# Patient Record
Sex: FEMALE | Race: WHITE | NOT HISPANIC OR LATINO | Employment: OTHER | URBAN - METROPOLITAN AREA
[De-identification: names, ages, dates, MRNs, and addresses within clinical notes are randomized per-mention and may not be internally consistent; named-entity substitution may affect disease eponyms.]

---

## 2023-09-09 PROBLEM — G47.00 INSOMNIA: Status: ACTIVE | Noted: 2023-09-09

## 2023-09-09 PROBLEM — B00.1 HERPES LABIALIS: Status: ACTIVE | Noted: 2023-09-09

## 2023-09-09 PROBLEM — E03.9 HYPOTHYROIDISM: Status: ACTIVE | Noted: 2023-09-09

## 2023-09-09 PROBLEM — M51.36 DEGENERATIVE LUMBAR DISC: Status: ACTIVE | Noted: 2023-09-09

## 2023-09-09 PROBLEM — D70.9 NEUTROPENIA (CMS-HCC): Status: ACTIVE | Noted: 2023-09-09

## 2023-09-09 PROBLEM — M51.369 DEGENERATIVE LUMBAR DISC: Status: ACTIVE | Noted: 2023-09-09

## 2023-09-09 PROBLEM — N90.4 VULVAR DYSTROPHY: Status: ACTIVE | Noted: 2023-09-09

## 2023-09-09 PROBLEM — E78.5 HYPERLIPIDEMIA: Status: ACTIVE | Noted: 2023-09-09

## 2023-09-09 PROBLEM — M16.11 PRIMARY OSTEOARTHRITIS OF RIGHT HIP: Status: ACTIVE | Noted: 2023-09-09

## 2023-09-09 PROBLEM — R73.9 HYPERGLYCEMIA: Status: ACTIVE | Noted: 2023-09-09

## 2023-09-09 PROBLEM — E55.9 VITAMIN D DEFICIENCY: Status: ACTIVE | Noted: 2023-09-09

## 2023-09-09 PROBLEM — M19.90 DEGENERATIVE JOINT DISEASE: Status: ACTIVE | Noted: 2023-09-09

## 2023-09-09 RX ORDER — ACYCLOVIR 400 MG/1
400 TABLET ORAL
COMMUNITY
Start: 2009-05-08 | End: 2023-12-18 | Stop reason: SDUPTHER

## 2023-09-09 RX ORDER — MULTIVITAMIN
TABLET ORAL DAILY
COMMUNITY
Start: 2009-05-08

## 2023-09-09 RX ORDER — CLOBETASOL PROPIONATE 0.5 MG/G
OINTMENT TOPICAL AS NEEDED
COMMUNITY
Start: 2020-02-13

## 2023-09-09 RX ORDER — VALACYCLOVIR HYDROCHLORIDE 1 G/1
TABLET, FILM COATED ORAL
COMMUNITY
Start: 2015-07-27 | End: 2024-03-26 | Stop reason: ALTCHOICE

## 2023-09-09 RX ORDER — LEVOTHYROXINE SODIUM 50 UG/1
50 TABLET ORAL DAILY
COMMUNITY
Start: 2009-05-08 | End: 2024-03-26 | Stop reason: SDUPTHER

## 2023-09-09 RX ORDER — CHOLECALCIFEROL (VITAMIN D3) 50 MCG
2000 TABLET ORAL DAILY
COMMUNITY
Start: 2022-03-16

## 2023-09-09 RX ORDER — SIMVASTATIN 10 MG/1
10 TABLET, FILM COATED ORAL EVERY EVENING
COMMUNITY
End: 2024-03-26 | Stop reason: SDUPTHER

## 2023-09-09 RX ORDER — ASCORBIC ACID 500 MG
500 TABLET ORAL DAILY
COMMUNITY
End: 2024-03-26 | Stop reason: ALTCHOICE

## 2023-12-18 ENCOUNTER — TELEPHONE (OUTPATIENT)
Dept: PRIMARY CARE | Facility: CLINIC | Age: 68
End: 2023-12-18

## 2023-12-18 DIAGNOSIS — B00.1 HERPES LABIALIS: ICD-10-CM

## 2023-12-18 RX ORDER — ACYCLOVIR 400 MG/1
400 TABLET ORAL
Qty: 30 TABLET | Refills: 5 | Status: SHIPPED | OUTPATIENT
Start: 2023-12-18

## 2024-03-21 ENCOUNTER — LAB (OUTPATIENT)
Dept: LAB | Facility: LAB | Age: 69
End: 2024-03-21
Payer: MEDICARE

## 2024-03-21 DIAGNOSIS — E03.9 HYPOTHYROIDISM, UNSPECIFIED: ICD-10-CM

## 2024-03-21 DIAGNOSIS — E78.5 HYPERLIPIDEMIA, UNSPECIFIED: Primary | ICD-10-CM

## 2024-03-21 DIAGNOSIS — D70.9 NEUTROPENIA, UNSPECIFIED (CMS-HCC): ICD-10-CM

## 2024-03-21 DIAGNOSIS — E55.9 VITAMIN D DEFICIENCY, UNSPECIFIED: ICD-10-CM

## 2024-03-21 DIAGNOSIS — R73.9 HYPERGLYCEMIA, UNSPECIFIED: ICD-10-CM

## 2024-03-21 LAB
25(OH)D3 SERPL-MCNC: 36 NG/ML (ref 31–100)
ALBUMIN SERPL-MCNC: 4.4 G/DL (ref 3.5–5)
ALP BLD-CCNC: 60 U/L (ref 35–125)
ALT SERPL-CCNC: 24 U/L (ref 5–40)
ANION GAP SERPL CALC-SCNC: 11 MMOL/L
AST SERPL-CCNC: 21 U/L (ref 5–40)
BILIRUB SERPL-MCNC: 0.8 MG/DL (ref 0.1–1.2)
BUN SERPL-MCNC: 14 MG/DL (ref 8–25)
CALCIUM SERPL-MCNC: 9.2 MG/DL (ref 8.5–10.4)
CHLORIDE SERPL-SCNC: 106 MMOL/L (ref 97–107)
CHOLEST SERPL-MCNC: 206 MG/DL (ref 133–200)
CHOLEST/HDLC SERPL: 3.7 {RATIO}
CO2 SERPL-SCNC: 27 MMOL/L (ref 24–31)
CREAT SERPL-MCNC: 1 MG/DL (ref 0.4–1.6)
EGFRCR SERPLBLD CKD-EPI 2021: 62 ML/MIN/1.73M*2
ERYTHROCYTE [DISTWIDTH] IN BLOOD BY AUTOMATED COUNT: 13 % (ref 11.5–14.5)
EST. AVERAGE GLUCOSE BLD GHB EST-MCNC: 126 MG/DL
GLUCOSE SERPL-MCNC: 107 MG/DL (ref 65–99)
HBA1C MFR BLD: 6 %
HCT VFR BLD AUTO: 40.6 % (ref 36–46)
HDLC SERPL-MCNC: 56 MG/DL
HGB BLD-MCNC: 12.7 G/DL (ref 12–16)
LDLC SERPL CALC-MCNC: 111 MG/DL (ref 65–130)
MCH RBC QN AUTO: 30.2 PG (ref 26–34)
MCHC RBC AUTO-ENTMCNC: 31.3 G/DL (ref 32–36)
MCV RBC AUTO: 97 FL (ref 80–100)
NRBC BLD-RTO: 0 /100 WBCS (ref 0–0)
PLATELET # BLD AUTO: 214 X10*3/UL (ref 150–450)
POTASSIUM SERPL-SCNC: 4.3 MMOL/L (ref 3.4–5.1)
PROT SERPL-MCNC: 6.4 G/DL (ref 5.9–7.9)
RBC # BLD AUTO: 4.2 X10*6/UL (ref 4–5.2)
SODIUM SERPL-SCNC: 144 MMOL/L (ref 133–145)
TRIGL SERPL-MCNC: 194 MG/DL (ref 40–150)
TSH SERPL DL<=0.05 MIU/L-ACNC: 2.88 MIU/L (ref 0.27–4.2)
WBC # BLD AUTO: 4 X10*3/UL (ref 4.4–11.3)

## 2024-03-21 PROCEDURE — 80053 COMPREHEN METABOLIC PANEL: CPT

## 2024-03-21 PROCEDURE — 84443 ASSAY THYROID STIM HORMONE: CPT

## 2024-03-21 PROCEDURE — 36415 COLL VENOUS BLD VENIPUNCTURE: CPT

## 2024-03-21 PROCEDURE — 83036 HEMOGLOBIN GLYCOSYLATED A1C: CPT

## 2024-03-21 PROCEDURE — 80061 LIPID PANEL: CPT

## 2024-03-21 PROCEDURE — 82306 VITAMIN D 25 HYDROXY: CPT

## 2024-03-21 PROCEDURE — 85027 COMPLETE CBC AUTOMATED: CPT

## 2024-03-26 ENCOUNTER — OFFICE VISIT (OUTPATIENT)
Dept: PRIMARY CARE | Facility: CLINIC | Age: 69
End: 2024-03-26
Payer: MEDICARE

## 2024-03-26 VITALS
DIASTOLIC BLOOD PRESSURE: 84 MMHG | HEIGHT: 64 IN | SYSTOLIC BLOOD PRESSURE: 128 MMHG | TEMPERATURE: 97.9 F | HEART RATE: 72 BPM | OXYGEN SATURATION: 97 % | BODY MASS INDEX: 29.71 KG/M2 | WEIGHT: 174 LBS

## 2024-03-26 DIAGNOSIS — E03.9 ACQUIRED HYPOTHYROIDISM: ICD-10-CM

## 2024-03-26 DIAGNOSIS — Z00.00 ENCOUNTER FOR MEDICARE ANNUAL WELLNESS EXAM: Primary | ICD-10-CM

## 2024-03-26 DIAGNOSIS — E78.2 MIXED HYPERLIPIDEMIA: ICD-10-CM

## 2024-03-26 DIAGNOSIS — Z12.31 ENCOUNTER FOR SCREENING MAMMOGRAM FOR BREAST CANCER: ICD-10-CM

## 2024-03-26 DIAGNOSIS — E55.9 VITAMIN D DEFICIENCY: ICD-10-CM

## 2024-03-26 PROCEDURE — 1036F TOBACCO NON-USER: CPT | Performed by: NURSE PRACTITIONER

## 2024-03-26 PROCEDURE — 1159F MED LIST DOCD IN RCRD: CPT | Performed by: NURSE PRACTITIONER

## 2024-03-26 PROCEDURE — G0439 PPPS, SUBSEQ VISIT: HCPCS | Performed by: NURSE PRACTITIONER

## 2024-03-26 PROCEDURE — 1126F AMNT PAIN NOTED NONE PRSNT: CPT | Performed by: NURSE PRACTITIONER

## 2024-03-26 PROCEDURE — 99215 OFFICE O/P EST HI 40 MIN: CPT | Performed by: NURSE PRACTITIONER

## 2024-03-26 RX ORDER — IBUPROFEN 100 MG/5ML
1000 SUSPENSION, ORAL (FINAL DOSE FORM) ORAL DAILY
COMMUNITY

## 2024-03-26 RX ORDER — SIMVASTATIN 20 MG/1
20 TABLET, FILM COATED ORAL EVERY EVENING
Qty: 90 TABLET | Refills: 3 | Status: SHIPPED | OUTPATIENT
Start: 2024-03-26

## 2024-03-26 RX ORDER — LEVOTHYROXINE SODIUM 50 UG/1
50 TABLET ORAL DAILY
Qty: 90 TABLET | Refills: 3 | Status: SHIPPED | OUTPATIENT
Start: 2024-03-26

## 2024-03-26 ASSESSMENT — ENCOUNTER SYMPTOMS
ADENOPATHY: 0
DEPRESSION: 0
ABDOMINAL PAIN: 0
DIAPHORESIS: 0
DIZZINESS: 0
BACK PAIN: 0
HEADACHES: 0
SPEECH DIFFICULTY: 0
COUGH: 0
FATIGUE: 0
SEIZURES: 0
AGITATION: 0
NAUSEA: 0
POLYDIPSIA: 0
BRUISES/BLEEDS EASILY: 0
PALPITATIONS: 0
FLANK PAIN: 0
WOUND: 0
DYSURIA: 0
POLYPHAGIA: 0
VOMITING: 0
CHEST TIGHTNESS: 0
NECK PAIN: 0
LOSS OF SENSATION IN FEET: 0
SHORTNESS OF BREATH: 0
FACIAL ASYMMETRY: 0
BLOOD IN STOOL: 0
CONFUSION: 0
CHILLS: 0
OCCASIONAL FEELINGS OF UNSTEADINESS: 0
HEMATURIA: 0
FEVER: 0

## 2024-03-26 ASSESSMENT — LIFESTYLE VARIABLES
HAVE YOU OR SOMEONE ELSE BEEN INJURED AS A RESULT OF YOUR DRINKING: NO
SKIP TO QUESTIONS 9-10: 1
HOW OFTEN DURING THE LAST YEAR HAVE YOU FAILED TO DO WHAT WAS NORMALLY EXPECTED FROM YOU BECAUSE OF DRINKING: NEVER
HOW OFTEN DO YOU HAVE A DRINK CONTAINING ALCOHOL: NEVER
HOW OFTEN DURING THE LAST YEAR HAVE YOU NEEDED AN ALCOHOLIC DRINK FIRST THING IN THE MORNING TO GET YOURSELF GOING AFTER A NIGHT OF HEAVY DRINKING: NEVER
HOW OFTEN DURING THE LAST YEAR HAVE YOU BEEN UNABLE TO REMEMBER WHAT HAPPENED THE NIGHT BEFORE BECAUSE YOU HAD BEEN DRINKING: NEVER
HOW OFTEN DURING THE LAST YEAR HAVE YOU HAD A FEELING OF GUILT OR REMORSE AFTER DRINKING: NEVER
AUDIT TOTAL SCORE: 0
HOW MANY STANDARD DRINKS CONTAINING ALCOHOL DO YOU HAVE ON A TYPICAL DAY: PATIENT DOES NOT DRINK
HAS A RELATIVE, FRIEND, DOCTOR, OR ANOTHER HEALTH PROFESSIONAL EXPRESSED CONCERN ABOUT YOUR DRINKING OR SUGGESTED YOU CUT DOWN: NO
HOW OFTEN DO YOU HAVE SIX OR MORE DRINKS ON ONE OCCASION: NEVER
HOW OFTEN DURING THE LAST YEAR HAVE YOU FOUND THAT YOU WERE NOT ABLE TO STOP DRINKING ONCE YOU HAD STARTED: NEVER
AUDIT-C TOTAL SCORE: 0

## 2024-03-26 ASSESSMENT — PATIENT HEALTH QUESTIONNAIRE - PHQ9
SUM OF ALL RESPONSES TO PHQ9 QUESTIONS 1 AND 2: 0
1. LITTLE INTEREST OR PLEASURE IN DOING THINGS: NOT AT ALL
2. FEELING DOWN, DEPRESSED OR HOPELESS: NOT AT ALL

## 2024-03-26 ASSESSMENT — PAIN SCALES - GENERAL: PAINLEVEL: 0-NO PAIN

## 2024-03-26 NOTE — PROGRESS NOTES
[facility]: [facility]  MEDICARE WELLNESS EXAM      Olena Guzmán is a 68 y.o. female that is presenting today for CPE.    Assessment/Plan    Diagnoses and all orders for this visit:    Encounter for Medicare annual wellness exam    Mixed hyperlipidemia  -     Tolerating statin. ASCVD Risk 8.7% intermediate. Choleterol could be better controlled.  -     Increase simvastatin (Zocor) 20 mg tablet; Take 1 tablet (20 mg) by mouth once daily in the evening.    Acquired hypothyroidism  -     Clinically and Chemmically Euthyroid  -     Continue levothyroxine (Synthroid, Levoxyl) 50 mcg tablet; Take 1 tablet (50 mcg) by mouth once daily.    Vitamin D deficiency        -     Continue daily OTC Vitamin D supplement    Encounter for screening mammogram for breast cancer  -     BI mammo bilateral screening tomosynthesis; Future    Other orders  -     Follow Up In Primary Care - Medicare Annual; Future    ADVANCED CARE PLANNING  Advanced Care Planning was discussed with patient:  The patient has an active advanced care plan on file. The patient has an active surrogate decision-maker on file.  Encouraged the patient to confirm that Living Will and Healthcare Power of  (HCPoA) are accurate and up to date.  Encouraged the patient to confirm that our office be provided a copy of any documentation in the event that anything changes.    ACTIVITIES OF DAILY LIVING  Basic ADLs:  Bathing: Independent, Dressing: Independent, Toileting: Independent, Transferring: Independent, Continence: Independent, Feeding: Independent.    Instrumental ADLs:  Ability to use phone: Independent, Shopping: Independent, Cooking: Independent, House-keeping: Independent, Laundry: Independent, Transportation: Independent, Medication Management: Independent, Finance Management: Independent.    Subjective   Subjective  Christie Guzmán is a 68 y.o. female who presents for follow up of hypothyroidism. Current symptoms: none.  Patient denies change in energy level, diarrhea, heat / cold intolerance, nervousness, and palpitations. Symptoms have been well-controlled.    [unfilled]     Objective  [unfilled]    Laboratory:  Lab Results       Component                Value               Date                       TSH                      2.88                03/21/2024              Assessment/Plan  Hypothyroidism.  Replacement is Levothyroxine 50 mcg daily  1. L-thyroxine per orders.  2. Recheck thyroid function tests in 1 year  3. Instructed not to take multivitamins or iron within 4 hours of taking thyroid medications.  4. Follow up in 1 year.    Madiha Jassody SIMIN Guzmán is here for follow up of dyslipidemia. Compliance with treatment has been good. The patient exercises daily. Patient denies muscle pain associated with her medications.    [unfilled]    Objective  [unfilled]    Lab Review  Lab Results       Component                Value               Date                       CHOL                     206 (H)             03/21/2024                 CHOL                     195                 03/17/2023                 CHOL                     212 (H)             09/13/2022                 CHOL                     194                 03/11/2022                 TRIG                     194 (H)             03/21/2024                 TRIG                     128                 03/17/2023                 TRIG                     146                 09/13/2022                 TRIG                     180 (H)             03/11/2022                 HDL                      56.0                03/21/2024                 HDL                      55                  03/17/2023                 HDL                      51                  09/13/2022                 HDL                      51                  03/11/2022               Assessment/Plan  Dyslipidemia under fair control.     1. Continue dietary measures.   2. Continue regular exercise.    3. Lipid-lowering medications: Simvastatin 10 mg QHS.  4. Increase Simvastatin to 20 mg daily due to ASCVD Risk 8.7% Intermediate.        Review of Systems   Constitutional:  Negative for chills, diaphoresis, fatigue and fever.   HENT:  Negative for hearing loss and mouth sores.    Eyes:  Negative for visual disturbance.   Respiratory:  Negative for cough, chest tightness and shortness of breath.    Cardiovascular:  Negative for chest pain, palpitations and leg swelling.   Gastrointestinal:  Negative for abdominal pain, blood in stool, nausea and vomiting.   Endocrine: Negative for cold intolerance, heat intolerance, polydipsia, polyphagia and polyuria.   Genitourinary:  Negative for dysuria, flank pain and hematuria.   Musculoskeletal:  Negative for back pain and neck pain.   Skin:  Negative for rash and wound.   Allergic/Immunologic: Negative for environmental allergies, food allergies and immunocompromised state.   Neurological:  Negative for dizziness, seizures, syncope, facial asymmetry, speech difficulty and headaches.   Hematological:  Negative for adenopathy. Does not bruise/bleed easily.   Psychiatric/Behavioral:  Negative for agitation and confusion.      Objective   Vitals:    03/26/24 0946   BP: 144/82   Pulse: 72   Temp: 36.6 °C (97.9 °F)   SpO2: 97%      Body mass index is 29.87 kg/m².  Physical Exam  Vitals and nursing note reviewed.   Constitutional:       General: She is not in acute distress.     Appearance: Normal appearance. She is not ill-appearing.   HENT:      Head: Normocephalic and atraumatic.      Right Ear: Tympanic membrane, ear canal and external ear normal. There is no impacted cerumen.      Left Ear: Tympanic membrane, ear canal and external ear normal. There is no impacted cerumen.      Nose: Nose normal.      Mouth/Throat:      Mouth: Mucous membranes are moist.      Pharynx: Oropharynx is clear. No oropharyngeal exudate or posterior oropharyngeal erythema.   Eyes:      General: No  scleral icterus.        Right eye: No discharge.         Left eye: No discharge.      Extraocular Movements: Extraocular movements intact.      Conjunctiva/sclera: Conjunctivae normal.      Pupils: Pupils are equal, round, and reactive to light.   Neck:      Vascular: No carotid bruit.   Cardiovascular:      Rate and Rhythm: Normal rate and regular rhythm.      Pulses: Normal pulses.      Heart sounds: Normal heart sounds. No murmur heard.  Pulmonary:      Effort: Pulmonary effort is normal. No respiratory distress.      Breath sounds: Normal breath sounds.   Abdominal:      General: Abdomen is flat. Bowel sounds are normal. There is no distension.      Palpations: Abdomen is soft. There is no mass.      Tenderness: There is no abdominal tenderness. There is no right CVA tenderness or left CVA tenderness.      Hernia: No hernia is present.   Musculoskeletal:         General: No tenderness. Normal range of motion.      Cervical back: No tenderness.      Right lower leg: No edema.      Left lower leg: No edema.   Lymphadenopathy:      Cervical: No cervical adenopathy.   Skin:     General: Skin is warm and dry.      Coloration: Skin is not jaundiced.      Findings: No rash.   Neurological:      General: No focal deficit present.      Mental Status: She is alert and oriented to person, place, and time. Mental status is at baseline.   Psychiatric:         Mood and Affect: Mood normal.         Behavior: Behavior normal.       Diagnostic Results   Lab Results   Component Value Date    GLUCOSE 107 (H) 03/21/2024    CALCIUM 9.2 03/21/2024     03/21/2024    K 4.3 03/21/2024    CO2 27 03/21/2024     03/21/2024    BUN 14 03/21/2024    CREATININE 1.00 03/21/2024     Lab Results   Component Value Date    ALT 24 03/21/2024    AST 21 03/21/2024    ALKPHOS 60 03/21/2024    BILITOT 0.8 03/21/2024     Lab Results   Component Value Date    WBC 4.0 (L) 03/21/2024    HGB 12.7 03/21/2024    HCT 40.6 03/21/2024    MCV 97  "03/21/2024     03/21/2024     Lab Results   Component Value Date    CHOL 206 (H) 03/21/2024    CHOL 195 03/17/2023    CHOL 212 (H) 09/13/2022     Lab Results   Component Value Date    HDL 56.0 03/21/2024    HDL 55 03/17/2023    HDL 51 09/13/2022     Lab Results   Component Value Date    LDLCALC 111 03/21/2024    LDLCALC 114 03/17/2023    LDLCALC 132 (H) 09/13/2022     Lab Results   Component Value Date    TRIG 194 (H) 03/21/2024    TRIG 128 03/17/2023    TRIG 146 09/13/2022     No components found for: \"CHOLHDL\"  Lab Results   Component Value Date    HGBA1C 6.0 (H) 03/21/2024     Other labs not included in the list above reviewed either before or during this encounter.    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    Hypothyroidism, unspecified 12/11/2013    Hypothyroidism    Other conditions influencing health status 01/04/2014    Benign Neoplasm Of The Anal Margin    Personal history of cervical dysplasia 02/13/2020    History of cervical dysplasia    Personal history of other diseases of the female genital tract     Vaginal delivery    Personal history of other diseases of the musculoskeletal system and connective tissue     Personal history of arthritis    Personal history of other infectious and parasitic diseases     History of varicella    Personal history of other specified conditions     History of abnormal Pap smear    Postmenopausal atrophic vaginitis 12/13/2013    Postmenopausal atrophic vaginitis    Pure hypercholesterolemia, unspecified     High cholesterol    Sprain of unspecified rotator cuff capsule, initial encounter 12/11/2013    Rotator cuff (capsule) sprain    Tachycardia, unspecified 12/11/2013    Sinus tachycardia    Unspecified injury of shoulder and upper arm, unspecified arm, initial encounter 12/11/2013    Shoulder injury     Past Surgical History:   Procedure Laterality Date    HYSTERECTOMY  03/18/2014    " Hysterectomy    KNEE ARTHROSCOPY W/ DEBRIDEMENT  10/10/2016    Knee Arthroscopy (Therapeutic)    OOPHORECTOMY  07/24/2014    Oophorectomy - Unilateral (Removal Of One Ovary)    ROTATOR CUFF REPAIR  10/10/2016    Rotator Cuff Repair     Family History   Problem Relation Name Age of Onset    Cancer Mother          Gallbladder    Lung cancer Father      Asthma Sister       Social History     Socioeconomic History    Marital status:      Spouse name: Not on file    Number of children: Not on file    Years of education: Not on file    Highest education level: Not on file   Occupational History    Not on file   Tobacco Use    Smoking status: Never     Passive exposure: Never    Smokeless tobacco: Never   Substance and Sexual Activity    Alcohol use: Never    Drug use: Never    Sexual activity: Not on file   Other Topics Concern    Not on file   Social History Narrative    Not on file     Social Determinants of Health     Financial Resource Strain: Not on file   Food Insecurity: Not on file   Transportation Needs: Not on file   Physical Activity: Not on file   Stress: Not on file   Social Connections: Not on file   Intimate Partner Violence: Not on file   Housing Stability: Not on file     No Known Allergies  Current Outpatient Medications on File Prior to Visit   Medication Sig Dispense Refill    acyclovir (Zovirax) 400 mg tablet Take 1 tablet (400 mg) by mouth 5 times a day. as directed/prn 30 tablet 5    ascorbic acid (Vitamin C) 1,000 mg tablet Take 1 tablet (1,000 mg) by mouth once daily.      cholecalciferol (Vitamin D3) 50 MCG (2000 UT) tablet Take 1 tablet (2,000 Units) by mouth once daily.      clobetasol (Temovate) 0.05 % ointment Apply topically if needed. APPLY SPARINGLY TO AFFECTED AREA(S)      multivitamin tablet Take by mouth once daily. As directed      [DISCONTINUED] ascorbic acid (Vitamin C) 500 mg tablet Take 1 tablet (500 mg) by mouth once daily.      [DISCONTINUED] levothyroxine (Synthroid,  Levoxyl) 50 mcg tablet Take 1 tablet (50 mcg) by mouth once daily.      [DISCONTINUED] simvastatin (Zocor) 10 mg tablet Take 1 tablet (10 mg) by mouth once daily in the evening.      [DISCONTINUED] valACYclovir (Valtrex) 1 gram tablet valACYclovir HCl - 1 GM Oral Tablet   Refills: 0        Start : 27-Jul-2015   Active       No current facility-administered medications on file prior to visit.     Immunization History   Administered Date(s) Administered    Moderna SARS-CoV-2 Vaccination 03/17/2021, 04/14/2021, 11/15/2021    Pfizer COVID-19 vaccine, bivalent, age 12 years and older (30 mcg/0.3 mL) 09/27/2022    Pneumococcal conjugate vaccine, 20-valent (PREVNAR 20) 03/21/2023     Patient's medical history was reviewed and updated either before or during this encounter.     Stefany Huang, APRN-CNP

## 2024-04-05 ENCOUNTER — HOSPITAL ENCOUNTER (OUTPATIENT)
Dept: RADIOLOGY | Facility: HOSPITAL | Age: 69
Discharge: HOME | End: 2024-04-05
Payer: MEDICARE

## 2024-04-05 VITALS — HEIGHT: 64 IN | BODY MASS INDEX: 29.02 KG/M2 | WEIGHT: 170 LBS

## 2024-04-05 DIAGNOSIS — Z12.31 ENCOUNTER FOR SCREENING MAMMOGRAM FOR BREAST CANCER: ICD-10-CM

## 2024-04-05 PROCEDURE — 77067 SCR MAMMO BI INCL CAD: CPT | Performed by: RADIOLOGY

## 2024-04-05 PROCEDURE — 77067 SCR MAMMO BI INCL CAD: CPT

## 2024-04-05 PROCEDURE — 77063 BREAST TOMOSYNTHESIS BI: CPT | Performed by: RADIOLOGY

## 2024-12-20 DIAGNOSIS — N90.4 VULVAR DYSTROPHY: Primary | ICD-10-CM

## 2024-12-26 RX ORDER — CLOBETASOL PROPIONATE 0.5 MG/G
OINTMENT TOPICAL 2 TIMES WEEKLY
Qty: 30 G | Refills: 0 | Status: SHIPPED | OUTPATIENT
Start: 2024-12-26

## 2025-01-27 ENCOUNTER — APPOINTMENT (OUTPATIENT)
Dept: OBSTETRICS AND GYNECOLOGY | Facility: CLINIC | Age: 70
End: 2025-01-27
Payer: MEDICARE

## 2025-01-27 VITALS
SYSTOLIC BLOOD PRESSURE: 122 MMHG | BODY MASS INDEX: 31.41 KG/M2 | HEIGHT: 64 IN | WEIGHT: 184 LBS | DIASTOLIC BLOOD PRESSURE: 84 MMHG

## 2025-01-27 DIAGNOSIS — Z01.419 WELL WOMAN EXAM WITH ROUTINE GYNECOLOGICAL EXAM: Primary | ICD-10-CM

## 2025-01-27 DIAGNOSIS — Z12.31 VISIT FOR SCREENING MAMMOGRAM: ICD-10-CM

## 2025-01-27 DIAGNOSIS — Z78.0 ASYMPTOMATIC MENOPAUSAL STATE: ICD-10-CM

## 2025-01-27 PROBLEM — Z86.19 HISTORY OF VARICELLA: Status: RESOLVED | Noted: 2025-01-27 | Resolved: 2025-01-27

## 2025-01-27 PROBLEM — N95.2 ATROPHIC VAGINITIS: Status: ACTIVE | Noted: 2025-01-27

## 2025-01-27 PROBLEM — R00.0 SINUS TACHYCARDIA: Status: RESOLVED | Noted: 2025-01-27 | Resolved: 2025-01-27

## 2025-01-27 PROCEDURE — 99397 PER PM REEVAL EST PAT 65+ YR: CPT | Performed by: NURSE PRACTITIONER

## 2025-01-27 PROCEDURE — 3008F BODY MASS INDEX DOCD: CPT | Performed by: NURSE PRACTITIONER

## 2025-01-27 PROCEDURE — 1159F MED LIST DOCD IN RCRD: CPT | Performed by: NURSE PRACTITIONER

## 2025-01-27 PROCEDURE — 1036F TOBACCO NON-USER: CPT | Performed by: NURSE PRACTITIONER

## 2025-01-27 ASSESSMENT — ENCOUNTER SYMPTOMS
CARDIOVASCULAR NEGATIVE: 1
NEUROLOGICAL NEGATIVE: 1
CONSTITUTIONAL NEGATIVE: 1
ENDOCRINE NEGATIVE: 1
SLEEP DISTURBANCE: 1
HEMATOLOGIC/LYMPHATIC NEGATIVE: 1
ALLERGIC/IMMUNOLOGIC NEGATIVE: 1
EYES NEGATIVE: 1
MUSCULOSKELETAL NEGATIVE: 1
GASTROINTESTINAL NEGATIVE: 1
RESPIRATORY NEGATIVE: 1

## 2025-01-27 NOTE — PROGRESS NOTES
"Chief Complaint    Annual Exam        HPI    ANNUAL    PAP 20 NEG  MAMM 24  DEXA 3/25/22  COLON 2018    LT BREAST SOMETIMES IS PAINFUL. DOESN'T FEEL ANY BUMPS OR LUMPS.   Last edited by Dewayne Oseguera MA on 2025 10:47 AM.         69 y.o.  female presents for annual well woman exam.   H/O TVH and RSO in 2003 for persistent cervical dysplasia and +HPV. Patient is uncertain to the grade of abnormalities of paps, but states she had LEEP in past and the \"numbers went higher\" which lead to the decision for hysterectomy. Paps normal since x20 years. Last pap 2020 negative and negative HPV. No indication for continued screenings.   Denies menopausal symptoms. She is not on hormone replacement therapy.  Denies any post-menopausal vaginal bleeding.  She is , currently sexually active. Denies dyspareunia.    Recent left breast pain. Onset ~1 week ago. Soreness, ache, discomfort feeling. No masses.  Denies pelvic pain.   H/O vulvar dermatitis and dystrophy. She uses Clobetasol ointment occasionally as needed. Denies abnormal vaginal discharge, itching, or odor today.  No urinary or bowel concerns. Colonoscopy: 2018 which was normal and is due to return at 10 years.   Bone density: 2022 Normal.  She is a non-smoker.   PMH: Arthritis, hypothyroidism, hyperlipidemia, hip replacement   Family h/o breast cancer: None. Mother had h/o benign lumpectomy.   Family h/o GYN cancer: None  Family h/o colon cancer: None  She is retired as . Enjoys reading.     /84   Ht 1.626 m (5' 4\")   Wt 83.5 kg (184 lb)   BMI 31.58 kg/m²      Current Outpatient Medications   Medication Instructions    acyclovir (ZOVIRAX) 400 mg, oral, 5 times daily, as directed/prn    ascorbic acid (VITAMIN C) 1,000 mg, Daily    cholecalciferol (VITAMIN D3) 2,000 Units, Daily    clobetasol (Temovate) 0.05 % ointment Topical, 2 times weekly, APPLY SPARINGLY TO AFFECTED AREA(S)    levothyroxine (SYNTHROID, LEVOXYL) 50 " mcg, oral, Daily    multivitamin tablet Daily    simvastatin (ZOCOR) 20 mg, oral, Every evening        Review of Systems   Constitutional: Negative.    HENT: Negative.     Eyes: Negative.    Respiratory: Negative.     Cardiovascular: Negative.    Gastrointestinal: Negative.    Endocrine: Negative.    Genitourinary: Negative.    Musculoskeletal: Negative.    Skin: Negative.    Allergic/Immunologic: Negative.    Neurological: Negative.    Hematological: Negative.    Psychiatric/Behavioral:  Positive for sleep disturbance.    All other systems reviewed and are negative.       Physical Exam  Constitutional:       Appearance: Normal appearance.   HENT:      Head: Normocephalic.      Nose: Nose normal.   Cardiovascular:      Rate and Rhythm: Normal rate and regular rhythm.   Pulmonary:      Effort: Pulmonary effort is normal.      Breath sounds: Normal breath sounds.   Chest:   Breasts:     Right: Normal.      Left: Normal.   Abdominal:      General: Abdomen is flat.      Palpations: Abdomen is soft.   Genitourinary:     General: Normal vulva.      Vagina: Normal.      Uterus: Absent.       Adnexa: Right adnexa normal and left adnexa normal.      Rectum: Normal.      Comments: Mild vulvar erythema  Musculoskeletal:         General: Normal range of motion.      Cervical back: Normal range of motion and neck supple.   Skin:     General: Skin is warm and dry.   Neurological:      Mental Status: She is alert.   Psychiatric:         Mood and Affect: Mood normal.         Behavior: Behavior normal.          Assessment/Plan:  1. Well woman exam with routine gynecological exam (Primary)  -Pap test no longer indicated.   -Breast exam normal today. Discussed pain in detail, option for earlier diagnostic imaging for further evaluation. At this time patient declines, plans to monitor. Follow up if has any new or worsening symptoms. Plan routine screening mammogram due in April and was ordered today. Self breast awareness exams  reviewed.  -Colonoscopy is up to date.  -Bone density ordered.  -Advised yearly well woman exams.   -Follow up sooner if needed.     2. Visit for screening mammogram  - BI mammo bilateral screening tomosynthesis; Future    3. Asymptomatic menopausal state  - XR DEXA bone density; Future

## 2025-02-03 DIAGNOSIS — E03.9 ACQUIRED HYPOTHYROIDISM: ICD-10-CM

## 2025-02-03 DIAGNOSIS — E78.2 MIXED HYPERLIPIDEMIA: ICD-10-CM

## 2025-02-03 RX ORDER — SIMVASTATIN 20 MG/1
20 TABLET, FILM COATED ORAL NIGHTLY
Qty: 90 TABLET | Refills: 1 | Status: SHIPPED | OUTPATIENT
Start: 2025-02-03

## 2025-02-03 RX ORDER — LEVOTHYROXINE SODIUM 50 UG/1
50 TABLET ORAL DAILY
Qty: 90 TABLET | Refills: 1 | Status: SHIPPED | OUTPATIENT
Start: 2025-02-03

## 2025-03-26 DIAGNOSIS — E78.2 MIXED HYPERLIPIDEMIA: ICD-10-CM

## 2025-03-26 DIAGNOSIS — Z11.59 NEED FOR HEPATITIS C SCREENING TEST: ICD-10-CM

## 2025-03-26 DIAGNOSIS — R53.83 FATIGUE, UNSPECIFIED TYPE: Primary | ICD-10-CM

## 2025-03-26 DIAGNOSIS — E55.9 VITAMIN D DEFICIENCY: ICD-10-CM

## 2025-03-26 DIAGNOSIS — E03.9 ACQUIRED HYPOTHYROIDISM: ICD-10-CM

## 2025-03-26 DIAGNOSIS — R73.9 HYPERGLYCEMIA: ICD-10-CM

## 2025-03-27 LAB
25(OH)D3+25(OH)D2 SERPL-MCNC: 37 NG/ML (ref 30–100)
ALBUMIN SERPL-MCNC: 4.6 G/DL (ref 3.6–5.1)
ALP SERPL-CCNC: 62 U/L (ref 37–153)
ALT SERPL-CCNC: 39 U/L (ref 6–29)
ANION GAP SERPL CALCULATED.4IONS-SCNC: 8 MMOL/L (CALC) (ref 7–17)
AST SERPL-CCNC: 26 U/L (ref 10–35)
BASOPHILS # BLD AUTO: 28 CELLS/UL (ref 0–200)
BASOPHILS NFR BLD AUTO: 0.6 %
BILIRUB SERPL-MCNC: 1 MG/DL (ref 0.2–1.2)
BUN SERPL-MCNC: 15 MG/DL (ref 7–25)
CALCIUM SERPL-MCNC: 9.2 MG/DL (ref 8.6–10.4)
CHLORIDE SERPL-SCNC: 106 MMOL/L (ref 98–110)
CHOLEST SERPL-MCNC: 194 MG/DL
CHOLEST/HDLC SERPL: 3.8 (CALC)
CO2 SERPL-SCNC: 28 MMOL/L (ref 20–32)
CREAT SERPL-MCNC: 0.84 MG/DL (ref 0.5–1.05)
EGFRCR SERPLBLD CKD-EPI 2021: 76 ML/MIN/1.73M2
EOSINOPHIL # BLD AUTO: 108 CELLS/UL (ref 15–500)
EOSINOPHIL NFR BLD AUTO: 2.3 %
ERYTHROCYTE [DISTWIDTH] IN BLOOD BY AUTOMATED COUNT: 12 % (ref 11–15)
EST. AVERAGE GLUCOSE BLD GHB EST-MCNC: 128 MG/DL
EST. AVERAGE GLUCOSE BLD GHB EST-SCNC: 7.1 MMOL/L
GLUCOSE SERPL-MCNC: 101 MG/DL (ref 65–99)
HBA1C MFR BLD: 6.1 % OF TOTAL HGB
HCT VFR BLD AUTO: 41.3 % (ref 35–45)
HCV AB SERPL QL IA: NORMAL
HDLC SERPL-MCNC: 51 MG/DL
HGB BLD-MCNC: 13.4 G/DL (ref 11.7–15.5)
LDLC SERPL CALC-MCNC: 109 MG/DL (CALC)
LYMPHOCYTES # BLD AUTO: 1354 CELLS/UL (ref 850–3900)
LYMPHOCYTES NFR BLD AUTO: 28.8 %
MCH RBC QN AUTO: 30.2 PG (ref 27–33)
MCHC RBC AUTO-ENTMCNC: 32.4 G/DL (ref 32–36)
MCV RBC AUTO: 93.2 FL (ref 80–100)
MONOCYTES # BLD AUTO: 470 CELLS/UL (ref 200–950)
MONOCYTES NFR BLD AUTO: 10 %
NEUTROPHILS # BLD AUTO: 2740 CELLS/UL (ref 1500–7800)
NEUTROPHILS NFR BLD AUTO: 58.3 %
NONHDLC SERPL-MCNC: 143 MG/DL (CALC)
PLATELET # BLD AUTO: 214 THOUSAND/UL (ref 140–400)
PMV BLD REES-ECKER: 10.1 FL (ref 7.5–12.5)
POTASSIUM SERPL-SCNC: 4.5 MMOL/L (ref 3.5–5.3)
PROT SERPL-MCNC: 6.9 G/DL (ref 6.1–8.1)
RBC # BLD AUTO: 4.43 MILLION/UL (ref 3.8–5.1)
SODIUM SERPL-SCNC: 142 MMOL/L (ref 135–146)
TRIGL SERPL-MCNC: 219 MG/DL
TSH SERPL-ACNC: 2.98 MIU/L (ref 0.4–4.5)
WBC # BLD AUTO: 4.7 THOUSAND/UL (ref 3.8–10.8)

## 2025-04-01 ENCOUNTER — OFFICE VISIT (OUTPATIENT)
Dept: PRIMARY CARE | Facility: CLINIC | Age: 70
End: 2025-04-01
Payer: MEDICARE

## 2025-04-01 VITALS
SYSTOLIC BLOOD PRESSURE: 120 MMHG | HEIGHT: 64 IN | HEART RATE: 83 BPM | WEIGHT: 184 LBS | DIASTOLIC BLOOD PRESSURE: 76 MMHG | TEMPERATURE: 96.9 F | BODY MASS INDEX: 31.41 KG/M2 | OXYGEN SATURATION: 96 %

## 2025-04-01 DIAGNOSIS — E55.9 VITAMIN D DEFICIENCY: ICD-10-CM

## 2025-04-01 DIAGNOSIS — E78.2 MIXED HYPERLIPIDEMIA: ICD-10-CM

## 2025-04-01 DIAGNOSIS — Z00.00 ENCOUNTER FOR MEDICARE ANNUAL WELLNESS EXAM: Primary | ICD-10-CM

## 2025-04-01 DIAGNOSIS — B00.1 HERPES LABIALIS: ICD-10-CM

## 2025-04-01 DIAGNOSIS — E03.9 ACQUIRED HYPOTHYROIDISM: ICD-10-CM

## 2025-04-01 PROCEDURE — 1160F RVW MEDS BY RX/DR IN RCRD: CPT | Performed by: NURSE PRACTITIONER

## 2025-04-01 PROCEDURE — 99215 OFFICE O/P EST HI 40 MIN: CPT | Performed by: NURSE PRACTITIONER

## 2025-04-01 PROCEDURE — 1123F ACP DISCUSS/DSCN MKR DOCD: CPT | Performed by: NURSE PRACTITIONER

## 2025-04-01 PROCEDURE — 1158F ADVNC CARE PLAN TLK DOCD: CPT | Performed by: NURSE PRACTITIONER

## 2025-04-01 PROCEDURE — 1036F TOBACCO NON-USER: CPT | Performed by: NURSE PRACTITIONER

## 2025-04-01 PROCEDURE — 1126F AMNT PAIN NOTED NONE PRSNT: CPT | Performed by: NURSE PRACTITIONER

## 2025-04-01 PROCEDURE — 3008F BODY MASS INDEX DOCD: CPT | Performed by: NURSE PRACTITIONER

## 2025-04-01 PROCEDURE — 1159F MED LIST DOCD IN RCRD: CPT | Performed by: NURSE PRACTITIONER

## 2025-04-01 PROCEDURE — G0439 PPPS, SUBSEQ VISIT: HCPCS | Performed by: NURSE PRACTITIONER

## 2025-04-01 RX ORDER — SIMVASTATIN 20 MG/1
20 TABLET, FILM COATED ORAL NIGHTLY
Qty: 90 TABLET | Refills: 3 | Status: SHIPPED | OUTPATIENT
Start: 2025-04-01

## 2025-04-01 RX ORDER — LEVOTHYROXINE SODIUM 50 UG/1
50 TABLET ORAL DAILY
Qty: 90 TABLET | Refills: 3 | Status: SHIPPED | OUTPATIENT
Start: 2025-04-01

## 2025-04-01 ASSESSMENT — ENCOUNTER SYMPTOMS
BACK PAIN: 0
CHEST TIGHTNESS: 0
NECK PAIN: 0
DEPRESSION: 0
ABDOMINAL PAIN: 0
LOSS OF SENSATION IN FEET: 0
FLANK PAIN: 0
DIAPHORESIS: 0
NAUSEA: 0
FEVER: 0
DIZZINESS: 0
OCCASIONAL FEELINGS OF UNSTEADINESS: 0
ADENOPATHY: 0
DYSURIA: 0
CHILLS: 0
WOUND: 0
COUGH: 0
AGITATION: 0
PALPITATIONS: 0
HEMATURIA: 0
BLOOD IN STOOL: 0
VOMITING: 0
POLYDIPSIA: 0
SEIZURES: 0
HEADACHES: 0
BRUISES/BLEEDS EASILY: 0
SHORTNESS OF BREATH: 0
SPEECH DIFFICULTY: 0
CONFUSION: 0
FACIAL ASYMMETRY: 0
POLYPHAGIA: 0
FATIGUE: 0

## 2025-04-01 ASSESSMENT — LIFESTYLE VARIABLES
HOW OFTEN DURING THE LAST YEAR HAVE YOU FOUND THAT YOU WERE NOT ABLE TO STOP DRINKING ONCE YOU HAD STARTED: NEVER
HOW OFTEN DURING THE LAST YEAR HAVE YOU BEEN UNABLE TO REMEMBER WHAT HAPPENED THE NIGHT BEFORE BECAUSE YOU HAD BEEN DRINKING: NEVER
SKIP TO QUESTIONS 9-10: 1
HOW OFTEN DURING THE LAST YEAR HAVE YOU NEEDED AN ALCOHOLIC DRINK FIRST THING IN THE MORNING TO GET YOURSELF GOING AFTER A NIGHT OF HEAVY DRINKING: NEVER
HOW OFTEN DO YOU HAVE A DRINK CONTAINING ALCOHOL: 2-4 TIMES A MONTH
HAS A RELATIVE, FRIEND, DOCTOR, OR ANOTHER HEALTH PROFESSIONAL EXPRESSED CONCERN ABOUT YOUR DRINKING OR SUGGESTED YOU CUT DOWN: NO
HAVE YOU OR SOMEONE ELSE BEEN INJURED AS A RESULT OF YOUR DRINKING: NO
HOW OFTEN DURING THE LAST YEAR HAVE YOU FAILED TO DO WHAT WAS NORMALLY EXPECTED FROM YOU BECAUSE OF DRINKING: NEVER
AUDIT TOTAL SCORE: 2
HOW MANY STANDARD DRINKS CONTAINING ALCOHOL DO YOU HAVE ON A TYPICAL DAY: 1 OR 2
AUDIT-C TOTAL SCORE: 2
HOW OFTEN DURING THE LAST YEAR HAVE YOU HAD A FEELING OF GUILT OR REMORSE AFTER DRINKING: NEVER
HOW OFTEN DO YOU HAVE SIX OR MORE DRINKS ON ONE OCCASION: NEVER

## 2025-04-01 ASSESSMENT — PATIENT HEALTH QUESTIONNAIRE - PHQ9
SUM OF ALL RESPONSES TO PHQ9 QUESTIONS 1 AND 2: 0
2. FEELING DOWN, DEPRESSED OR HOPELESS: NOT AT ALL
1. LITTLE INTEREST OR PLEASURE IN DOING THINGS: NOT AT ALL

## 2025-04-01 ASSESSMENT — PAIN SCALES - GENERAL: PAINLEVEL_OUTOF10: 0-NO PAIN

## 2025-04-01 NOTE — PROGRESS NOTES
[facility]: [facility]  MEDICARE WELLNESS EXAM      Olena Guzmán is a 69 y.o. female that is presenting today for Annual Medicare Wellness Exam.    Encouraged Covid 19 and Tdap immunizations via local pharmacy.    Ms. Guzmán reports tolerating statin. She is trying to follow a low cholesterol diet. Denies statin related abdominal pain, myalgias or arthralgias.    She reports taking levothyroxine as directed without food. Denies heat or cold intolerance, palpitations or unintentional weight changes. TSH dated 03/26/25 was 2.98.    Assessment/Plan      Diagnoses and all orders for this visit:    Encounter for Medicare annual wellness exam        -     Routine and preventative care provided and discussed with patient    Mixed hyperlipidemia  -     Tolerating statin  -     ASCVD Risk 6.9%  -     simvastatin (Zocor) 20 mg tablet; Take 1 tablet (20 mg) by mouth once daily at bedtime.    Acquired hypothyroidism  -     Clinically and chemically euthyroid  -     levothyroxine (Synthroid) 50 mcg tablet; Take 1 tablet (50 mcg) by mouth once daily.    Vitamin D deficiency        -     Continue daily OTC Vitamin D supplement    Herpes labialis        -     acyclovir 400 mg 5 times daily as needed    Other orders  -     Follow Up In Primary Care - Medicare Annual; Future    ADVANCED CARE PLANNING  Advanced Care Planning was discussed with patient:  The patient has an active surrogate decision-maker on file. The patient does not have an advanced care plan on file.  Encouraged the patient to confirm that Living Will and Healthcare Power of  (HCPoA) are accurate and up to date.  Encouraged the patient to confirm that our office be provided a copy of any documentation in the event that anything changes.    ACTIVITIES OF DAILY LIVING  Basic ADLs:  Bathing: Independent, Dressing: Independent, Toileting: Independent, Transferring: Independent, Continence: Independent, Feeding: Independent.     Instrumental ADLs:  Ability to use phone: Independent, Shopping: Independent, Cooking: Independent, House-keeping: Independent, Laundry: Independent, Transportation: Independent, Medication Management: Independent, Finance Management: Independent.    Subjective   HPI  Review of Systems   Constitutional:  Negative for chills, diaphoresis, fatigue and fever.   HENT:  Negative for hearing loss and mouth sores.    Eyes:  Negative for visual disturbance.   Respiratory:  Negative for cough, chest tightness and shortness of breath.    Cardiovascular:  Negative for chest pain, palpitations and leg swelling.   Gastrointestinal:  Negative for abdominal pain, blood in stool, nausea and vomiting.   Endocrine: Negative for cold intolerance, heat intolerance, polydipsia and polyphagia.   Genitourinary:  Negative for dysuria, flank pain and hematuria.   Musculoskeletal:  Negative for back pain and neck pain.   Skin:  Negative for rash and wound.   Allergic/Immunologic: Negative for environmental allergies, food allergies and immunocompromised state.   Neurological:  Negative for dizziness, seizures, syncope, facial asymmetry, speech difficulty and headaches.   Hematological:  Negative for adenopathy. Does not bruise/bleed easily.   Psychiatric/Behavioral:  Negative for agitation and confusion.      Objective   Vitals:    04/01/25 0952   BP: 120/76   Pulse: 83   Temp: 36.1 °C (96.9 °F)   SpO2: 96%      Body mass index is 31.58 kg/m².  Physical Exam  Vitals and nursing note reviewed.   Constitutional:       General: She is not in acute distress.     Appearance: Normal appearance. She is not ill-appearing.   HENT:      Head: Normocephalic and atraumatic.      Right Ear: Tympanic membrane, ear canal and external ear normal. There is no impacted cerumen.      Left Ear: Tympanic membrane, ear canal and external ear normal. There is no impacted cerumen.      Nose: Nose normal.      Mouth/Throat:      Mouth: Mucous membranes are moist.       Pharynx: Oropharynx is clear. No oropharyngeal exudate or posterior oropharyngeal erythema.   Eyes:      General: No scleral icterus.        Right eye: No discharge.         Left eye: No discharge.      Extraocular Movements: Extraocular movements intact.      Conjunctiva/sclera: Conjunctivae normal.      Pupils: Pupils are equal, round, and reactive to light.   Neck:      Vascular: No carotid bruit.   Cardiovascular:      Rate and Rhythm: Normal rate and regular rhythm.      Pulses: Normal pulses.      Heart sounds: Normal heart sounds. No murmur heard.  Pulmonary:      Effort: Pulmonary effort is normal. No respiratory distress.      Breath sounds: Normal breath sounds.   Abdominal:      General: Abdomen is flat. Bowel sounds are normal. There is no distension.      Palpations: Abdomen is soft. There is no mass.      Tenderness: There is no abdominal tenderness. There is no right CVA tenderness or left CVA tenderness.   Musculoskeletal:         General: No tenderness. Normal range of motion.      Cervical back: No tenderness.      Right lower leg: No edema.      Left lower leg: No edema.   Lymphadenopathy:      Cervical: No cervical adenopathy.   Skin:     General: Skin is warm and dry.      Coloration: Skin is not jaundiced.      Findings: No rash.   Neurological:      General: No focal deficit present.      Mental Status: She is alert and oriented to person, place, and time. Mental status is at baseline.   Psychiatric:         Mood and Affect: Mood normal.         Behavior: Behavior normal.       Diagnostic Results   Lab Results   Component Value Date    GLUCOSE 101 (H) 03/26/2025    CALCIUM 9.2 03/26/2025     03/26/2025    K 4.5 03/26/2025    CO2 28 03/26/2025     03/26/2025    BUN 15 03/26/2025    CREATININE 0.84 03/26/2025     Lab Results   Component Value Date    ALT 39 (H) 03/26/2025    AST 26 03/26/2025    ALKPHOS 62 03/26/2025    BILITOT 1.0 03/26/2025     Lab Results   Component Value  "Date    WBC 4.7 03/26/2025    HGB 13.4 03/26/2025    HCT 41.3 03/26/2025    MCV 93.2 03/26/2025     03/26/2025     Lab Results   Component Value Date    CHOL 194 03/26/2025    CHOL 206 (H) 03/21/2024    CHOL 195 03/17/2023     Lab Results   Component Value Date    HDL 51 03/26/2025    HDL 56.0 03/21/2024    HDL 55 03/17/2023     Lab Results   Component Value Date    LDLCALC 109 (H) 03/26/2025    LDLCALC 111 03/21/2024    LDLCALC 114 03/17/2023     Lab Results   Component Value Date    TRIG 219 (H) 03/26/2025    TRIG 194 (H) 03/21/2024    TRIG 128 03/17/2023     No components found for: \"CHOLHDL\"  Lab Results   Component Value Date    HGBA1C 6.1 (H) 03/26/2025     Other labs not included in the list above reviewed either before or during this encounter.    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    History of varicella 01/27/2025    Hypothyroidism, unspecified 12/11/2013    Hypothyroidism    Other conditions influencing health status 01/04/2014    Benign Neoplasm Of The Anal Margin    Personal history of cervical dysplasia 02/13/2020    History of cervical dysplasia    Personal history of other diseases of the female genital tract     Vaginal delivery    Personal history of other diseases of the musculoskeletal system and connective tissue     Personal history of arthritis    Personal history of other infectious and parasitic diseases     History of varicella    Personal history of other specified conditions     History of abnormal Pap smear    Postmenopausal atrophic vaginitis 12/13/2013    Postmenopausal atrophic vaginitis    Pure hypercholesterolemia, unspecified     High cholesterol    Sinus tachycardia 01/27/2025    Comment on above: Added by Problem List Migration; 2013-12-11;      Sprain of unspecified rotator cuff capsule, initial encounter 12/11/2013    Rotator cuff (capsule) sprain    Tachycardia, " unspecified 12/11/2013    Sinus tachycardia    Unspecified injury of shoulder and upper arm, unspecified arm, initial encounter 12/11/2013    Shoulder injury     Past Surgical History:   Procedure Laterality Date    HYSTERECTOMY  03/18/2014    Hysterectomy    KNEE ARTHROSCOPY W/ DEBRIDEMENT  10/10/2016    Knee Arthroscopy (Therapeutic)    OOPHORECTOMY  07/24/2014    Oophorectomy - Unilateral (Removal Of One Ovary)    ROTATOR CUFF REPAIR  10/10/2016    Rotator Cuff Repair    TONSILLECTOMY  1974     Family History   Problem Relation Name Age of Onset    Cancer Mother Whitney         Gallbladder    Diabetes Mother Whitney     Hearing loss Mother Whitney     Lung cancer Father Emeterio     Cancer Father Emeterio     Asthma Sister Robyn     Diabetes Sister Robyn     Hearing loss Sister Robyn     Asthma Sister Michelle      Social History     Socioeconomic History    Marital status:      Spouse name: Not on file    Number of children: Not on file    Years of education: Not on file    Highest education level: Not on file   Occupational History    Not on file   Tobacco Use    Smoking status: Never     Passive exposure: Never    Smokeless tobacco: Never   Vaping Use    Vaping status: Never Used   Substance and Sexual Activity    Alcohol use: Yes     Alcohol/week: 4.0 standard drinks of alcohol     Types: 4 Standard drinks or equivalent per week     Comment: SOCIALLY    Drug use: Never    Sexual activity: Yes     Partners: Male     Birth control/protection: Female Sterilization   Other Topics Concern    Not on file   Social History Narrative    Not on file     Social Drivers of Health     Financial Resource Strain: Not on file   Food Insecurity: Not on file   Transportation Needs: Not on file   Physical Activity: Not on file   Stress: Not on file   Social Connections: Not on file   Intimate Partner Violence: Not on file   Housing Stability: Not on file     Allergies   Allergen Reactions    Clindamycin Headache     Current Outpatient  Medications on File Prior to Visit   Medication Sig Dispense Refill    acyclovir (Zovirax) 400 mg tablet Take 1 tablet (400 mg) by mouth 5 times a day. as directed/prn 30 tablet 5    ascorbic acid (Vitamin C) 1,000 mg tablet Take 1 tablet (1,000 mg) by mouth once daily.      cholecalciferol (Vitamin D3) 50 MCG (2000 UT) tablet Take 1 tablet (2,000 Units) by mouth once daily.      clobetasol (Temovate) 0.05 % ointment Apply topically 2 times a week. APPLY SPARINGLY TO AFFECTED AREA(S) 30 g 0    multivitamin tablet Take by mouth once daily. As directed      simvastatin (Zocor) 20 mg tablet TAKE ONE TABLET BY MOUTH EVERY EVENING 90 tablet 1    Synthroid 50 mcg tablet TAKE 1 TABLET (50 MCG) BY MOUTH ONCE DAILY. 90 tablet 1     No current facility-administered medications on file prior to visit.     Immunization History   Administered Date(s) Administered    Moderna SARS-CoV-2 Vaccination 03/17/2021, 04/14/2021, 11/15/2021    Pfizer COVID-19 vaccine, bivalent, age 12 years and older (30 mcg/0.3 mL) 09/27/2022    Pneumococcal conjugate vaccine, 20-valent (PREVNAR 20) 03/21/2023     Patient's medical history was reviewed and updated either before or during this encounter.     Stefany Huang, APRN-CNP

## 2025-04-07 ENCOUNTER — HOSPITAL ENCOUNTER (OUTPATIENT)
Dept: RADIOLOGY | Facility: HOSPITAL | Age: 70
Discharge: HOME | End: 2025-04-07
Payer: MEDICARE

## 2025-04-07 DIAGNOSIS — Z12.31 VISIT FOR SCREENING MAMMOGRAM: ICD-10-CM

## 2025-04-07 DIAGNOSIS — Z78.0 ASYMPTOMATIC MENOPAUSAL STATE: ICD-10-CM

## 2025-04-07 PROCEDURE — 77063 BREAST TOMOSYNTHESIS BI: CPT

## 2025-04-07 PROCEDURE — 77080 DXA BONE DENSITY AXIAL: CPT | Performed by: RADIOLOGY

## 2025-04-07 PROCEDURE — 77080 DXA BONE DENSITY AXIAL: CPT

## 2025-04-07 PROCEDURE — 77067 SCR MAMMO BI INCL CAD: CPT | Performed by: RADIOLOGY

## 2025-04-07 PROCEDURE — 77063 BREAST TOMOSYNTHESIS BI: CPT | Performed by: RADIOLOGY
